# Patient Record
(demographics unavailable — no encounter records)

---

## 2024-10-08 NOTE — DISCUSSION/SUMMARY
[FreeTextEntry1] : RESULTS TRANSMISSION Aman Snyder is a 74-year-old male who was called on 10/08/2024 for a discussion regarding their genetic testing results related to hereditary cancer predisposition.   Mr. Snyder was originally seen at [facility] on 09/16/2024 for hereditary cancer predisposition risk assessment due to a family history of cancer and a family history of the APC increased risk allele (p. [id number]). Mr. Snyder decided to pursue genetic testing for genes associated with breast cancer, gynecological cancers, and colorectal cancer including the APC gene offered by [facility].  TEST RESULTS: NEGATIVE  No pathogenic (disease-causing) variants or VUSs were detected in the following genes:  APC, KWAKU, AXIN2, BARD1, BMPR1A, BRCA1, BRCA2, BRIP1, CDH1, CHEK2, EPCAM, GREM1, MLH1, MSH2, MSH3, MSH6, MUTYH, NF1, NTHL1, PALB2, PMS2, POLD1, POLE, PTEN, RAD51C, RAD51D, SMAD4, STK11, TP53  Of note, the APC increased risk allele was not identified on Mr. Snyder's genetic testing results.  RESULTS INTERPRETATION AND ASSESSMENT: Given Mr. Snyder's personal medical history and current reported family history of cancer, and his negative genetic test results, the following screening guidelines and risk-reducing recommendations were discussed:  OTHER:  - In the absence of other indications, Mr. Snyder should practice age-appropriate cancer screening of other organ systems as recommended for the general population.  We also discussed that, while no cause of the patient's personal and family history of cancer was identified, this result, while reassuring, does entirely not rule out a hereditary cancer risk in the patient. It is possible, although unlikely, the patient has a mutation in one of the genes tested that is not detectable by this analysis, or has a mutation in a different gene, either known or unknown. It is also possible there is a hereditary cancer predisposition in the family, but the patient did not inherit it.  We informed Mr. Snyder that our knowledge of genetics and inherited cancer conditions is changing rapidly. Therefore, we recommended that Mr. Snyder contact our office, every 2 to 3 years, to discuss relevant advances in cancer genetics.  We emphasized the importance of re-contacting us with updates regarding his personal and family history of cancer as well as any updates regarding additional cancer genetic test results performed for the patient and/or family members.  Such updates could possibly change our risk assessment and recommendations.    In addition, we discussed Mr. Snyder's sister and nephews as well as other maternal extended relatives could consider pursuing cancer risk assessment genetic counseling with the option of genetic testing.   PLAN: 1.See above for recommended screening and risk-reduction strategies. 2. Patient informed consult note(s) will be available through their [facility] patient portal and genetic test results will be released via [email or web address] laboratory portal.  3. Mr. Snyder was encouraged to contact us every 2-3 years to discuss relevant advances in cancer genetics, or sooner if there are any changes in his personal or family history of cancer.   For any additional questions please call [facility] at [phone].    Ciarra Birmingham MS, [facility] Counselor, [facility]   CC:  Patient Dr. Mercy Nevarez

## 2024-11-22 NOTE — ASSESSMENT
[FreeTextEntry1] : Will obtain CT of Abdomen and Pelvis with and without  Ambien for sleep Meloxicam for pain

## 2024-11-22 NOTE — HISTORY OF PRESENT ILLNESS
[FreeTextEntry1] : Patient seen in office today because of abnormal labs Markedly increased LFT  We also discussed the issue of insomnia [de-identified] : Other labs noted including the increased A1C Will need to follow diet

## 2024-12-09 NOTE — HEALTH RISK ASSESSMENT
[No] : No [No falls in past year] : Patient reported no falls in the past year [0] : 2) Feeling down, depressed, or hopeless: Not at all (0) [[id number]] : 0

## 2024-12-09 NOTE — HISTORY OF PRESENT ILLNESS
[FreeTextEntry1] : Sleeping better with Ambien 8 hours  [de-identified] : Medication without change  Colonoscopy 2 years ago  No urinary frequency

## 2024-12-09 NOTE — ASSESSMENT
[FreeTextEntry1] : Stable emphasized: Repeat LFT and CBC Suggest using a H2 blocker at night for questionable reflux  Further plana after review of studies

## 2025-03-06 NOTE — HISTORY OF PRESENT ILLNESS
[FreeTextEntry1] : Recent urgent care :  Pneumonia Doxycycline given for a week  Also GI endoscopy and non-bleeding ulcer [de-identified] : Also, GERD  Frequent cough

## 2025-03-06 NOTE — HEALTH RISK ASSESSMENT
[No] : No [No falls in past year] : Patient reported no falls in the past year [0] : 2) Feeling down, depressed, or hopeless: Not at all (0) [[id number]] : 0

## 2025-05-20 NOTE — HISTORY OF PRESENT ILLNESS
Pt here for FU, R hand tenosynovitis/  LOV 8/2023, no injection at this visit.  Last injections 4/24/23 and 6/22/23, were helpful, swelling resolved.  Intermittent pain (rates 1/10) at night or when lifting heavy objects, does not wake at night, denies taking analgesics.   No functional problems.    RMF pt unable to fully extend finger.    Pt mentioned L dorsal hand (LTH-CMC) firm, non-painful mass    Follow-up right hand extensor tenosynovitis    2 injections, as above    Last office visit 8/14/2023.  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  Synovitis is often difficult to cure.  It may require multiple treatments over a long period of time, and symptoms may not go away completely.  Even with satisfactory treatment, synovitis may recur.  Questions were answered and the patient wishes to proceed with treatment.     Non-operative therapy may help, but surgery may still be necessary.    Injection - I would recommend steroid injection.  This may not relieve symptoms.  Repeat injections may be necessary.  The entire area may be numb for one to two hours after the injection.  It may take several days for the injection to take effect.      INJECTION PROCEDURE NOTE     Procedure performed by: Allen Mabry MD    Date: 3/11/2024    Procedural time-out required/obtained? Yes    PROCEDURE DETAILS:  Informed consent was obtained.    Questions answered and the patient wishes to proceed with treatment.  The areas were cleaned using aseptic technique.   Location: right  extensor synovitis  Injected with: Celestone 6 mg/Lidocaine  1% injection (2cc total) x1.5  Estimated blood loss: None  The patient tolerated the procedure well.  Plan: Follow up as directed.      +++++++++++++++++++++++++++++++++++++++++      MEDICAL DECISION MAKING    PROBLEMS      MODERATE    (number / complexity)          Chronic illness with exacerbation    DATA         STRAIGHTFORWARD    (amount / complexity)           MANAGEMENT RISK  MODERATE    (complications/ morbidity)       Steroid injection                  [facility] LEVEL    MODERATE       [FreeTextEntry1] : Started coughing again  Went to urgent care and cough med given Cough improving  [de-identified] : Rash chest area  Now resolved  Will follow up with GI

## 2025-05-20 NOTE — ASSESSMENT
[FreeTextEntry1] : Patient with evidence of bronchitis Will start Amoxicillin; Also renew cough med

## 2025-05-20 NOTE — HEALTH RISK ASSESSMENT
[No] : No [No falls in past year] : Patient reported no falls in the past year [0] : 2) Feeling down, depressed, or hopeless: Not at all (0) [[id number]] : 0